# Patient Record
Sex: MALE | Race: AMERICAN INDIAN OR ALASKA NATIVE
[De-identification: names, ages, dates, MRNs, and addresses within clinical notes are randomized per-mention and may not be internally consistent; named-entity substitution may affect disease eponyms.]

---

## 2018-03-11 ENCOUNTER — HOSPITAL ENCOUNTER (EMERGENCY)
Dept: HOSPITAL 5 - ED | Age: 63
LOS: 1 days | Discharge: HOME | End: 2018-03-12
Payer: MEDICAID

## 2018-03-11 VITALS — SYSTOLIC BLOOD PRESSURE: 148 MMHG | DIASTOLIC BLOOD PRESSURE: 85 MMHG

## 2018-03-11 DIAGNOSIS — R10.84: ICD-10-CM

## 2018-03-11 DIAGNOSIS — I10: ICD-10-CM

## 2018-03-11 DIAGNOSIS — F17.200: ICD-10-CM

## 2018-03-11 DIAGNOSIS — N21.0: Primary | ICD-10-CM

## 2018-03-11 DIAGNOSIS — K76.89: ICD-10-CM

## 2018-03-11 LAB
ALBUMIN SERPL-MCNC: 4.3 G/DL (ref 3.9–5)
ALT SERPL-CCNC: 13 UNITS/L (ref 7–56)
BASOPHILS # (AUTO): 0 K/MM3 (ref 0–0.1)
BASOPHILS NFR BLD AUTO: 0.1 % (ref 0–1.8)
BILIRUB DIRECT SERPL-MCNC: < 0.2 MG/DL (ref 0–0.2)
BUN SERPL-MCNC: 16 MG/DL (ref 9–20)
BUN/CREAT SERPL: 11 %
CALCIUM SERPL-MCNC: 9.1 MG/DL (ref 8.4–10.2)
EOSINOPHIL # BLD AUTO: 0 K/MM3 (ref 0–0.4)
EOSINOPHIL NFR BLD AUTO: 0 % (ref 0–4.3)
HCT VFR BLD CALC: 39.8 % (ref 35.5–45.6)
HEMOLYSIS INDEX: 15
HGB BLD-MCNC: 13.2 GM/DL (ref 11.8–15.2)
LIPASE SERPL-CCNC: 175 UNITS/L (ref 13–60)
LYMPHOCYTES # BLD AUTO: 0.9 K/MM3 (ref 1.2–5.4)
LYMPHOCYTES NFR BLD AUTO: 10.5 % (ref 13.4–35)
MCH RBC QN AUTO: 30 PG (ref 28–32)
MCHC RBC AUTO-ENTMCNC: 33 % (ref 32–34)
MCV RBC AUTO: 92 FL (ref 84–94)
MONOCYTES # (AUTO): 0.8 K/MM3 (ref 0–0.8)
MONOCYTES % (AUTO): 8.6 % (ref 0–7.3)
PLATELET # BLD: 158 K/MM3 (ref 140–440)
RBC # BLD AUTO: 4.34 M/MM3 (ref 3.65–5.03)

## 2018-03-11 PROCEDURE — C9113 INJ PANTOPRAZOLE SODIUM, VIA: HCPCS

## 2018-03-11 PROCEDURE — 83690 ASSAY OF LIPASE: CPT

## 2018-03-11 PROCEDURE — 96361 HYDRATE IV INFUSION ADD-ON: CPT

## 2018-03-11 PROCEDURE — 99284 EMERGENCY DEPT VISIT MOD MDM: CPT

## 2018-03-11 PROCEDURE — 80074 ACUTE HEPATITIS PANEL: CPT

## 2018-03-11 PROCEDURE — 36415 COLL VENOUS BLD VENIPUNCTURE: CPT

## 2018-03-11 PROCEDURE — 80048 BASIC METABOLIC PNL TOTAL CA: CPT

## 2018-03-11 PROCEDURE — 96375 TX/PRO/DX INJ NEW DRUG ADDON: CPT

## 2018-03-11 PROCEDURE — 85025 COMPLETE CBC W/AUTO DIFF WBC: CPT

## 2018-03-11 PROCEDURE — 74177 CT ABD & PELVIS W/CONTRAST: CPT

## 2018-03-11 PROCEDURE — 96365 THER/PROPH/DIAG IV INF INIT: CPT

## 2018-03-11 NOTE — EMERGENCY DEPARTMENT REPORT
ED Abdominal Pain HPI





- General


Chief Complaint: Abdominal Pain


Stated Complaint: ABDOMINAL PAIN


Time Seen by Provider: 18 20:13


Source: patient, EMS


Mode of arrival: Wheelchair


Limitations: No Limitations





- History of Present Illness


Initial Comments: 


Mr. person is a 63-year-old male who was recently admitted to an outside 

hospital In [place] 5 months ago.  He stated that he underwent a host of 

tests to determine the cause of abdominal pain.  He was treated for bacterial 

infection.  Similar symptoms have returned.  He does drink alcohol occasionally.


 pain is described as generalized aching sharp 8/10 there is no n/v/d no fever 

or chills 





MD Complaint: abdominal pain


Onset/Timin


-: week(s)


Location: diffuse


Radiation: none


Migration to: no migration


Severity: moderate


Severity scale (0 -10): 7


Quality: aching


Consistency: constant


Improves With: nothing


Worsens With: nothing


Associated Symptoms: denies: nausea, vomiting, diarrhea, fever, chills, 

constipation, dysuria, hematemesis, melena, hematuria, anorexia


Treatments Prior to Arrival: NSAIDs





- Related Data


 Home Medications











 Medication  Instructions  Recorded  Confirmed  Last Taken


 


Diclofenac Sodium 75 mg PO Q12H 13


 


amLODIPine [Norvasc] 10 mg PO DAILY 13








 Previous Rx's











 Medication  Instructions  Recorded  Last Taken  Type


 


Ciprofloxacin HCl [Cipro] 500 mg PO Q12H #14 tab 14 Unknown Rx


 


HYDROcodone/APAP  [Norco 1 each PO Q6HR PRN #25 tablet 14 Unknown Rx





 mg TAB]    


 


Prednisone [Prednisone 10 mg 10 mg PO .TAPER #1 tab.ds.pk 14 Unknown Rx





(6-Day Pack, 21 Tabs)]    


 


HYDROcodone/APAP 5-325 [[place] 1 - 2 each PO Q6HR PRN #20 tablet 10/13/14 

Unknown Rx





5/325]    


 


Ibuprofen [Motrin 800 MG tab] 800 mg PO TID PRN #20 tablet 10/13/14 Unknown Rx


 


Levofloxacin [Levaquin TAB] 500 mg PO DAILY #10 tablet 18 Unknown Rx


 


Omeprazole 40 mg PO BID #60 capsule. 18 Unknown Rx


 


traMADol [Ultram 50 MG tab] 50 mg PO Q6HR PRN #21 tablet 18 Unknown Rx











 Allergies











Allergy/AdvReac Type Severity Reaction Status Date / Time


 


Penicillins Allergy  Hives Verified 14 11:34














ED Review of Systems


ROS: 


Stated complaint: ABDOMINAL PAIN


Other details as noted in HPI





Constitutional: denies: chills, fever


Eyes: denies: eye pain, eye discharge, vision change


ENT: denies: ear pain, throat pain


Respiratory: denies: cough, shortness of breath, wheezing


Cardiovascular: denies: chest pain, palpitations


Endocrine: no symptoms reported


Gastrointestinal: abdominal pain.  denies: nausea, vomiting, diarrhea, 

constipation, hematemesis, melena, hematochezia


Genitourinary: denies: urgency, dysuria


Musculoskeletal: denies: back pain, joint swelling, arthralgia


Skin: denies: rash, lesions


Neurological: denies: headache, weakness, paresthesias


Psychiatric: anxiety.  denies: depression


Hematological/Lymphatic: denies: easy bleeding, easy bruising





ED Past Medical Hx





- Past Medical History


Previous Medical History?: Yes


Hx Hypertension: Yes


Hx Heart Attack/AMI: No


Hx Congestive Heart Failure: No


Hx Diabetes: No


Hx Deep Vein Thrombosis: No


Hx Pulmonary Embolism: No


Hx Liver Disease: No


Hx Renal Disease: No


Hx Sickle Cell Disease: No


Hx Arthritis: No


Hx Seizures: No


Hx Kidney Stones: No


Hx Asthma: No


Hx COPD: No


Hx Tuberculosis: No


Hx Dementia: No


Hx HIV: No


Additional medical history: Anemia, Bacterial infection in his stomach 5 months 

ago





- Surgical History


Hx Coronary Stent: No


Hx Open Heart Surgery: No


Hx Pacemaker: No


Hx Internal Defibrillator: No


Hx Cholecystectomy: No


Hx Appendectomy: No


Hx Breast Surgery: No





- Social History


Smoking Status: Current Every Day Smoker





- Medications


Home Medications: 


 Home Medications











 Medication  Instructions  Recorded  Confirmed  Last Taken  Type


 


Diclofenac Sodium 75 mg PO Q12H 13 History


 


amLODIPine [Norvasc] 10 mg PO DAILY 13 History


 


Ciprofloxacin HCl [Cipro] 500 mg PO Q12H #14 tab 14  Unknown Rx


 


HYDROcodone/APAP  [Norco 1 each PO Q6HR PRN #25 tablet 14  Unknown 

Rx





 mg TAB]     


 


Prednisone [Prednisone 10 mg 10 mg PO .TAPER #1 tab.ds.pk 14  Unknown Rx





(6-Day Pack, 21 Tabs)]     


 


HYDROcodone/APAP 5-325 [[place] 1 - 2 each PO Q6HR PRN #20 tablet 10/13/14  

Unknown Rx





5/325]     


 


Ibuprofen [Motrin 800 MG tab] 800 mg PO TID PRN #20 tablet 10/13/14  Unknown Rx


 


Levofloxacin [Levaquin TAB] 500 mg PO DAILY #10 tablet 18  Unknown Rx


 


Omeprazole 40 mg PO BID #60 capsule.dr 18  Unknown Rx


 


traMADol [Ultram 50 MG tab] 50 mg PO Q6HR PRN #21 tablet 18  Unknown Rx














ED Physical Exam





- General


Limitations: No Limitations


General appearance: alert, in no apparent distress, anxious





- Head


Head exam: Present: atraumatic, normocephalic





- Eye


Eye exam: Present: normal appearance





- ENT


ENT exam: Present: mucous membranes moist





- Neck


Neck exam: Present: normal inspection, full ROM.  Absent: tenderness, 

lymphadenopathy, thyromegaly





- Respiratory


Respiratory exam: Present: normal lung sounds bilaterally.  Absent: respiratory 

distress, wheezes, rhonchi, stridor, chest wall tenderness





- Cardiovascular


Cardiovascular Exam: Present: regular rate, normal rhythm, normal heart sounds





- GI/Abdominal


GI/Abdominal exam: Present: soft, tenderness (generalized ), guarding (mild 

guarding generalized to palpation), normal bowel sounds.  Absent: distended, 

rebound, rigid, organomegaly, mass, bruit, pulsatile mass, hernia





- Rectal


Rectal exam: Present: deferred





- Extremities Exam


Extremities exam: Present: normal inspection, full ROM.  Absent: tenderness





- Back Exam


Back exam: Present: normal inspection, full ROM.  Absent: tenderness, CVA 

tenderness (R), CVA tenderness (L), muscle spasm, paraspinal tenderness, 

vertebral tenderness, rash noted





- Neurological Exam


Neurological exam: Present: alert, oriented X3, CN II-XII intact, normal gait, 

reflexes normal





- Psychiatric


Psychiatric exam: Present: normal affect, normal mood





- Skin


Skin exam: Present: warm, dry, intact, normal color.  Absent: rash





ED Course


 Vital Signs











  18





  19:23


 


Temperature 98.0 F


 


Pulse Rate 63


 


Respiratory 18





Rate 


 


Blood Pressure 148/85


 


O2 Sat by Pulse 99





Oximetry 














ED Medical Decision Making





- Lab Data


Result diagrams: 


 18 20:12





 18 20:12








 Laboratory Tests











  18





  20:12 20:12


 


WBC  8.8 


 


RBC  4.34 


 


Hgb  13.2 


 


Hct  39.8 


 


MCV  92 


 


MCH  30 


 


MCHC  33 


 


RDW  14.5 


 


Plt Count  158 


 


Lymph % (Auto)  10.5 L 


 


Mono % (Auto)  8.6 H 


 


Eos % (Auto)  0.0 


 


Baso % (Auto)  0.1 


 


Lymph #  0.9 L 


 


Mono #  0.8 


 


Eos #  0.0 


 


Baso #  0.0 


 


Seg Neutrophils %  80.8 H 


 


Seg Neutrophils #  7.1 


 


Sodium   137


 


Potassium   4.0


 


Chloride   98.2


 


Carbon Dioxide   25


 


Anion Gap   18


 


BUN   16


 


Creatinine   1.4


 


Estimated GFR   > 60


 


BUN/Creatinine Ratio   11


 


Glucose   121 H


 


Calcium   9.1


 


Total Bilirubin   0.80


 


Direct Bilirubin   < 0.2


 


Indirect Bilirubin   0.6


 


AST   25


 


ALT   13


 


Alkaline Phosphatase   66


 


Total Protein   6.9


 


Albumin   4.3


 


Albumin/Globulin Ratio   1.7


 


Lipase   175 H














- Radiology Data


Radiology results: report reviewed, image reviewed


Small rihg middle lobe nodule, small benign focal liver lesion , bladder stone 

5.4 mk, moderate enlarged prostate. 








- Medical Decision Making


Mr. nelson is a 63-year-old male who was recently admitted to an outside 

hospital In [place] 5 months ago.  He stated that he underwent a host of 

tests to determine the cause of abdominal pain.  He was treated for bacterial 

infection.  Similar symptoms have returned.  He does drink alcohol occasionally.


 pain is described as generalized aching sharp 8/10 there is no n/v/d no fever 

or chills pt appears uncomfortable abd: bs normal soft mild guarding and 

generalized tenderness to palpation no bruit no hernia no cva tenderness no 

fever no chills  cmp: lipase: 175, ast and alt normal,  cbc: normal  Ua:pt 

refuses    CT abd/pelvis: see read,  bladder calculi, lung cyst, liver cyst, 

mild hydronephrosis , moderate prostated enlargement plan: levaquin 500 mg po , 

tramadol, flomax, emeprzole, follow up with Dr. Jama DO as scheduled, follow 

up with urology Dr Mari in 2-3 days, follow up with pcp in 2-3 days.  pt 

verbalized agreement and understanding of same.  pt is now improve 2/10 pt is 

tolerating po intake, there is no hematuria no fever no chills no n/v 


Critical care attestation.: 


If time is entered above; I have spent that time in minutes in the direct care 

of this critically ill patient, excluding procedure time.








ED Disposition


Clinical Impression: 


 Bladder calculi, Renal stones, Hepatic cyst, Lung cyst





Abdominal pain


Qualifiers:


 Abdominal location: generalized Qualified Code(s): R10.84 - Generalized 

abdominal pain





Disposition:  TO HOME OR SELFCARE


Is pt being admited?: No


Does the pt Need Aspirin: No


Condition: Good


Instructions:  Kidney Stones (ED), Flank Pain (ED), Prostatitis (ED)


Prescriptions: 


Levofloxacin [Levaquin TAB] 500 mg PO DAILY #10 tablet


Omeprazole 40 mg PO BID #60 capsule.


traMADol [Ultram 50 MG tab] 50 mg PO Q6HR PRN #21 tablet


 PRN Reason: Pain


Referrals: 


GOPI MARI MD [Referring] - 3-5 Days


ANA GUZMAN MD [Staff Physician] - 3-5 Days


Forms:  Work/School Release Form(ED)


Time of Disposition: 22:51

## 2018-03-11 NOTE — EMERGENCY DEPARTMENT REPORT
Chief Complaint: Abdominal Pain


Stated Complaint: ABDOMINAL PAIN





- HPI


History of Present Illness: 





Mr. person is a 63-year-old male who was recently admitted to an outside 

hospital In [place] 5 months ago.  He stated that he underwent a host of 

tests to determine the cause of abdominal pain.  He was treated for bacterial 

infection.  Similar symptoms have returned.  He does drink alcohol occasionally.





- Exam


Vital Signs: 


 Vital Signs











  03/11/18





  19:23


 


Temperature 98.0 F


 


Pulse Rate 63


 


Respiratory 18





Rate 


 


Blood Pressure 148/85


 


O2 Sat by Pulse 99





Oximetry 











MSE screening note: 


Focused history and physical exam performed.


Due to findings the following was ordered:











ED Disposition for MSE


Condition: Stable

## 2018-03-11 NOTE — CAT SCAN REPORT
FINAL REPORT



PROCEDURE:  CT abdomen and pelvis with contrast. 



TECHNIQUE:  Computerized axial tomography of the abdomen and

pelvis was performed after the IV injection of iodinated nonionic

contrast.



HISTORY:  Abdominal pain. 



COMPARISON:  No prior studies are available for comparison. 



FINDINGS:  

Image quality is degraded on some of the slices because of motion

artifact. There is a small noncalcified nodule in the right

middle lobe. This has smooth margins and measures 5.3 millimeters

in diameter. It likely represents a benign lesion such as a

granuloma. It is an indeterminate finding however and I would

recommend follow-up imaging to document stability or resolution.

There are no pleural effusions. The heart size is normal. There

are a few tiny foci of diminished attenuation within the liver.

These probably represent benign lesions such as cysts or

hamartomas. The largest lesion is in the medial segment of the

left lobe measuring 6.9 millimeters. The gallbladder is present.

The pancreas and spleen appear normal. The adrenal glands are not

enlarged. There are 2 left renal cysts. There is a delay in

excretion from the left kidney. There is mild left hydronephrosis

and hydroureter. There is a bladder calculus measuring 5.4

millimeters. This was probably recently passed from the left

ureter. Referral to a urologist may be indicated. The abdominal

aorta has a normal caliber. There is no retroperitoneal

adenopathy. The unopacified gastrointestinal tract is

unremarkable. A normal appendix is visible. The prostate is

enlarged. The regional skeleton appears intact. There is severe

disc space narrowing at L4-5. 



IMPRESSION:  

Small indeterminate right middle lobe nodule. Follow-up

recommended. Small probably benign focal liver lesions. 5.4

millimeter diameter bladder calculus probably recently passed

from the left ureter. Moderate enlargement of the prostate.